# Patient Record
Sex: FEMALE | Race: WHITE | Employment: FULL TIME | URBAN - METROPOLITAN AREA
[De-identification: names, ages, dates, MRNs, and addresses within clinical notes are randomized per-mention and may not be internally consistent; named-entity substitution may affect disease eponyms.]

---
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
## 2023-02-01 ENCOUNTER — OFFICE VISIT (OUTPATIENT)
Dept: OBGYN CLINIC | Age: 18
End: 2023-02-01

## 2023-02-01 DIAGNOSIS — Z30.46 ENCOUNTER FOR NEXPLANON REMOVAL: Primary | ICD-10-CM

## 2023-02-01 DIAGNOSIS — Z30.09 BIRTH CONTROL COUNSELING: ICD-10-CM

## 2023-02-01 RX ORDER — DESOGESTREL AND ETHINYL ESTRADIOL 21-5 (28)
1 KIT ORAL DAILY
Qty: 3 PACKET | Refills: 3 | Status: SHIPPED | OUTPATIENT
Start: 2023-02-01

## 2023-02-01 ASSESSMENT — ENCOUNTER SYMPTOMS
ALLERGIC/IMMUNOLOGIC NEGATIVE: 1
RESPIRATORY NEGATIVE: 1
EYES NEGATIVE: 1
GASTROINTESTINAL NEGATIVE: 1

## 2023-02-01 NOTE — PROGRESS NOTES
Name: Aly Mckeon  Date: 2023  YOB: 2005  LMP: No LMP recorded. Ana Gonzalez is a 19 y.o.   who is here for a problem visit for nexplanon removal . She really tried hard but the BTB was just too much. Birth control: nexplanon placed in 1 year ago  Menstrual status: irregular cycles  Gyn Surgery: none    Health Maintenance:  Pap Smear: none  Any history of abnormal pap smear? no  Mammo: never done  HPV vaccine: not done  Colonoscopy: not ever done  Dexa scan: not needed    Review of Systems   Constitutional: Negative. HENT: Negative. Eyes: Negative. Respiratory: Negative. Cardiovascular: Negative. Gastrointestinal: Negative. Endocrine: Negative. Genitourinary: Negative. Musculoskeletal: Negative. Skin: Negative. Allergic/Immunologic: Negative. Neurological: Negative. Hematological: Negative. Psychiatric/Behavioral: Negative. Negative for self-injury and suicidal ideas. No past medical history on file. Past Surgical History:  2018: DILATION AND CURETTAGE OF UTERUS       Current Outpatient Medications:     sertraline (ZOLOFT) 50 MG tablet, TAKE 1 TABLET BY MOUTH ONCE A DAY FOR A WEEK, THEN INCREASE TO 2 TABLETS A DAY, Disp: 60 tablet, Rfl: 2    etonogestrel (NEXPLANON) 68 MG implant, Inject into the skin, Disp: , Rfl:     Family Cancer History:   Cancer-related family history includes Breast Cancer in her maternal grandfather and maternal grandmother; Cancer in her maternal grandmother; Ovarian Cancer in her maternal aunt. Social History:  reports that she has quit smoking. She uses smokeless tobacco. She reports that she does not drink alcohol and does not use drugs.     Ob History:  # 1 - Date: None, Sex: None, Weight: None, GA: None, Delivery: None, Apgar1: None, Apgar5: None, Living: None, Birth Comments: None    # 2 - Date: 2018, Sex: None, Weight: None, GA: None, Delivery: None, Apgar1: None, Apgar5: None, Living: None, Birth Comments: None         Sexual History:  reports that she is not currently sexually active and has had partner(s) who are male. She reports using the following method of birth control/protection: Implant. PHYSICAL EXAM:  Vitals:  vitals were not taken for this visit. There is no height or weight on file to calculate BMI. Physical Exam  Constitutional:       General: She is awake. She is not in acute distress. Appearance: Normal appearance. She is not ill-appearing. HENT:      Head: Normocephalic and atraumatic. Eyes:      Conjunctiva/sclera: Conjunctivae normal.   Cardiovascular:      Rate and Rhythm: Normal rate. Pulmonary:      Effort: Pulmonary effort is normal.   Musculoskeletal:         General: Normal range of motion. Cervical back: Normal range of motion. Skin:     General: Skin is warm and dry. Neurological:      General: No focal deficit present. Mental Status: She is alert and oriented to person, place, and time. Motor: Motor function is intact. Coordination: Coordination is intact. Psychiatric:         Behavior: Behavior normal.         Cognition and Memory: Cognition normal.         Judgment: Judgment normal.      .. PROCEDURE: nexplanon removal  Area cleansed with betadine  Injected with 1% lidocaine with epi  Incised with 11 blade  Nexplanon removed with curved carlos  Area cleansed and dressed  Pt tolerated procedure well.      Assessment: 19 y.o. , , here for problem visit for birth control changes  Nexplanon removed  Discussed birth control options, Will start ocp      Wali Sharma MD

## 2023-02-05 NOTE — PROGRESS NOTES
[facility] CARDIOLOGY History & Physical                 Reason for Visit: Sinus tachycardia    Subjective:     Patient is a 19 y.o. female who presents as a referral for sinus tachycardia. The patient visited the ED on January 27 for an upper respiratory infection. D-dimer was normal.  She was noted to have a leukocytosis. TSH was normal.  The patient was noted to be in sinus tachycardia. The patient had an ambulatory ECG monitor placed January 31 at [facility]. She reports that she recently returned to monitor via mail. The results are not readily apparent in the EMR. She reports fatigue and tachycardia. She also reports having 3 viral URIs in the last couple months. Her father accompanies her to her appointment and reports that he has HFrEF. No past medical history on file. Past Surgical History:   Procedure Laterality Date    DILATION AND CURETTAGE OF UTERUS  2018      Family History   Problem Relation Age of Onset    No Known Problems Mother     Diabetes Maternal Great Grandmother     Ovarian Cancer Maternal Aunt     No Known Problems Paternal Grandfather     No Known Problems Paternal Grandmother     Breast Cancer Maternal Grandfather     Breast Cancer Maternal Grandmother     Cancer Maternal Grandmother     No Known Problems Sister       Social History     Tobacco Use    Smoking status: Former    Smokeless tobacco: Current    Tobacco comments:     Quit smoking: Patient vapes   Substance Use Topics    Alcohol use: No      No Known Allergies      ROS:  No obvious pertinent positives on review of systems except for what was outlined above.        Objective:       BP (!) 110/102   Pulse 98   Wt 128 lb (58.1 kg)   BMI 25.85 kg/m²     BP Readings from Last 3 Encounters:   02/06/23 (!) 110/102   07/28/22 101/62   04/28/22 100/60       Wt Readings from Last 3 Encounters:   02/06/23 128 lb (58.1 kg)   12/15/22 132 lb (59.9 kg) (57 %, Z= 0.17)*   07/28/22 120 lb (54.4 kg) (35 %, Z= -0.39)*     * Growth percentiles are based on CDC (Girls, 2-20 Years) data. General/Constitutional:   Alert and oriented x 3, no acute distress  HEENT:   normocephalic, atraumatic, moist mucous membranes  Neck:   No JVD or carotid bruits bilaterally  Cardiovascular:   regular rate and rhythm, no rub/gallop appreciated  Pulmonary:   clear to auscultation bilaterally, no respiratory distress  Abdomen:   soft, non-tender, non-distended  Ext:   No sig LE edema bilaterally  Skin:  warm and dry, no obvious rashes seen  Neuro:   no obvious sensory or motor deficits  Psychiatric:   normal mood and affect    Data Review:   No results found for: CHOL  No results found for: TRIG  No results found for: HDL  No results found for: LDLCHOLESTEROL, LDLCALC  No results found for: LABVLDL, VLDL  No results found for: [facility]     Lab Results   Component Value Date/Time     07/28/2022 02:37 PM    K 3.6 07/28/2022 02:37 PM     07/28/2022 02:37 PM    CO2 22 07/28/2022 02:37 PM    BUN 8 07/28/2022 02:37 PM    CREATININE 0.70 07/28/2022 02:37 PM    GLUCOSE 82 07/28/2022 02:37 PM    CALCIUM 9.7 07/28/2022 02:37 PM         Lab Results   Component Value Date    ALT 54 07/28/2022    AST 34 07/28/2022        Assessment/Plan:   1.  Palpitations  - Follow-up ambulatory ECG monitor (obtain medical records)  - Obtain an echocardiogram    F/U: 4 weeks    Morgan Rees MD

## 2023-02-06 ENCOUNTER — OFFICE VISIT (OUTPATIENT)
Dept: CARDIOLOGY CLINIC | Age: 18
End: 2023-02-06
Payer: COMMERCIAL

## 2023-02-06 VITALS
BODY MASS INDEX: 25.85 KG/M2 | HEART RATE: 98 BPM | SYSTOLIC BLOOD PRESSURE: 110 MMHG | DIASTOLIC BLOOD PRESSURE: 102 MMHG | WEIGHT: 128 LBS

## 2023-02-06 DIAGNOSIS — R00.2 PALPITATIONS: Primary | ICD-10-CM

## 2023-02-06 PROBLEM — R00.0 SINUS TACHYCARDIA BY ELECTROCARDIOGRAM: Status: ACTIVE | Noted: 2023-02-06

## 2023-02-06 PROCEDURE — 99213 OFFICE O/P EST LOW 20 MIN: CPT | Performed by: INTERNAL MEDICINE

## 2023-02-08 ENCOUNTER — TELEPHONE (OUTPATIENT)
Dept: CARDIOLOGY CLINIC | Age: 18
End: 2023-02-08

## 2023-02-08 NOTE — TELEPHONE ENCOUNTER
Pt made aware of results. Verb understanding. Pt states her testing at [facility] cardiology indicated she had sinus tachycardia. Explained that is a rhythm issue, not an echocardiographic, heart function issue. States but t\"achycardia can cause heart failure and death. \" Explained that there is low risk for this with sinus tachycardia. Recommend pt keep f/u appt in March to discuss treatment plan. Verb understanding.

## 2023-02-08 NOTE — TELEPHONE ENCOUNTER
Pt made aware of Dr. Manish Coleman response. States she had her monitor placed at [address] [facility]. States she plans to continue to see both practices as she believes it's good to have a second opinion. Call placed to [facility] requesting monitor report. Spoke to Alf Thornton who will fax full report.

## 2023-02-08 NOTE — TELEPHONE ENCOUNTER
MD Manuelito Jackson RN  Caller: Unspecified (Today, 11:07 AM)  Please let the patient know that she was primarily in sinus rhythm. Her average heart rate was 97 bpm.  There is no evidence of atrial fibrillation, atrial flutter, or SVT. No ectopy was noted. She triggered the monitor with sinus tachycardia. No new changes in medical management at this time    Pt made aware of Dr. Brenda Pereira response. Requests to see another cardiologist in this practice. Please advise if OK to change to another provider.

## 2023-02-08 NOTE — TELEPHONE ENCOUNTER
----- Message from Ceasar Payne MD sent at 2/7/2023  6:09 PM EST -----  Please let the patient know that the heart function is normal on ECHO.

## 2023-02-08 NOTE — TELEPHONE ENCOUNTER
----- Message from Jessica Severino MD sent at 2/8/2023 10:15 AM EST -----  I don't have recommendations since I do not have the results of her ambulatory ECG monitor. Please acquire them if available.   ----- Message -----  From: Ethel Tatum RN  Sent: 2/8/2023   9:44 AM EST  To: Jessica Severino MD    Please advise any recommendations prior to f/u appt  ----- Message -----  From: Jessica Severino MD  Sent: 2/7/2023   6:09 PM EST  To: [facility]    Please let the patient know that the heart function is normal on ECHO. N/A

## 2023-03-02 ENCOUNTER — APPOINTMENT (OUTPATIENT)
Dept: CT IMAGING | Age: 18
End: 2023-03-02
Payer: COMMERCIAL

## 2023-03-02 ENCOUNTER — HOSPITAL ENCOUNTER (EMERGENCY)
Age: 18
Discharge: HOME | End: 2023-03-02
Attending: EMERGENCY MEDICINE
Payer: COMMERCIAL

## 2023-03-02 ENCOUNTER — APPOINTMENT (OUTPATIENT)
Dept: GENERAL RADIOLOGY | Age: 18
End: 2023-03-02
Payer: COMMERCIAL

## 2023-03-02 VITALS
SYSTOLIC BLOOD PRESSURE: 105 MMHG | RESPIRATION RATE: 17 BRPM | HEART RATE: 98 BPM | DIASTOLIC BLOOD PRESSURE: 72 MMHG | TEMPERATURE: 97.3 F | OXYGEN SATURATION: 96 %

## 2023-03-02 DIAGNOSIS — F41.0 PANIC ATTACK: Primary | ICD-10-CM

## 2023-03-02 LAB
EKG ATRIAL RATE: 102 BPM
EKG DIAGNOSIS: NORMAL
EKG P AXIS: 69 DEGREES
EKG P-R INTERVAL: 120 MS
EKG Q-T INTERVAL: 350 MS
EKG QRS DURATION: 80 MS
EKG QTC CALCULATION (BAZETT): 456 MS
EKG R AXIS: 80 DEGREES
EKG T AXIS: 57 DEGREES
EKG VENTRICULAR RATE: 102 BPM

## 2023-03-02 PROCEDURE — 71045 X-RAY EXAM CHEST 1 VIEW: CPT

## 2023-03-02 PROCEDURE — 70450 CT HEAD/BRAIN W/O DYE: CPT

## 2023-03-02 PROCEDURE — 93005 ELECTROCARDIOGRAM TRACING: CPT | Performed by: PHYSICIAN ASSISTANT

## 2023-03-02 PROCEDURE — 96372 THER/PROPH/DIAG INJ SC/IM: CPT

## 2023-03-02 PROCEDURE — 99284 EMERGENCY DEPT VISIT MOD MDM: CPT

## 2023-03-02 PROCEDURE — 6360000002 HC RX W HCPCS: Performed by: PHYSICIAN ASSISTANT

## 2023-03-02 RX ORDER — LORAZEPAM 2 MG/ML
0.5 INJECTION INTRAMUSCULAR ONCE
Status: DISCONTINUED | OUTPATIENT
Start: 2023-03-02 | End: 2023-03-02

## 2023-03-02 RX ORDER — HYDROXYZINE PAMOATE 25 MG/1
25 CAPSULE ORAL 3 TIMES DAILY PRN
Qty: 28 CAPSULE | Refills: 0 | Status: SHIPPED | OUTPATIENT
Start: 2023-03-02 | End: 2023-03-16

## 2023-03-02 RX ORDER — LORAZEPAM 2 MG/ML
0.5 INJECTION INTRAMUSCULAR ONCE
Status: COMPLETED | OUTPATIENT
Start: 2023-03-02 | End: 2023-03-02

## 2023-03-02 RX ADMIN — LORAZEPAM 0.5 MG: 2 INJECTION INTRAMUSCULAR; INTRAVENOUS at 11:44

## 2023-03-02 ASSESSMENT — ENCOUNTER SYMPTOMS
COUGH: 0
ABDOMINAL PAIN: 0
SORE THROAT: 0
NAUSEA: 0
SHORTNESS OF BREATH: 0
VOMITING: 0
BACK PAIN: 0

## 2023-03-02 NOTE — ED NOTES
I have reviewed discharge instructions with the patient. The patient verbalized understanding. Patient left ED via Discharge Method: ambulatory to Home with family. Opportunity for questions and clarification provided. Patient given 1 scripts. To continue your aftercare when you leave the hospital, you may receive an automated call from our care team to check in on how you are doing. This is a free service and part of our promise to provide the best care and service to meet your aftercare needs.  If you have questions, or wish to unsubscribe from this service please call [phone]. Thank you for Choosing our [facility] Emergency Department.         Lindy Yanez RN  03/02/23 4502

## 2023-03-02 NOTE — ED TRIAGE NOTES
Pt- lightheadedness since last night, body is getting numb, chest tight, \"hurting bad\". 8/10. Pain - whole body.

## 2023-03-02 NOTE — ED PROVIDER NOTES
Emergency Department Provider Note                   PCP:                On File Not (Inactive)               Age: 19 y.o. Sex: female       ICD-10-CM    1. Panic attack  F41.0           DISPOSITION Decision To Discharge 03/02/2023 12:00:14 PM       Maria Luz Pereira is a 19 y.o. female who presents to the Emergency Department with chief complaint of    Chief Complaint   Patient presents with    Numbness     body      Patient is a 64-year-old female history of underlying anxiety, presents here with chief complaint of \"chest hurting, headache, confusion and whole body going numb. She seemed very anxious in triage, stuttering most of her words which she states is new for her and she is never experienced before. She is very tearful in triage as well. She cannot localize her pinpoint any specific pains in her body she states her whole body feels weird. Denies any homicidal ideation or suicidal ideation. The history is provided by the patient. No  was used. Review of Systems   Constitutional:  Negative for chills and fever. HENT:  Negative for congestion and sore throat. Respiratory:  Negative for cough and shortness of breath. Cardiovascular:  Negative for chest pain and palpitations. Gastrointestinal:  Negative for abdominal pain, nausea and vomiting. Genitourinary:  Negative for dysuria and vaginal discharge. Musculoskeletal:  Negative for back pain. Skin:  Negative for wound. Neurological:  Positive for headaches. Psychiatric/Behavioral:  Positive for confusion. Negative for agitation. The patient is nervous/anxious. All other systems reviewed and are negative. No past medical history on file.      Past Surgical History:   Procedure Laterality Date    DILATION AND CURETTAGE OF UTERUS  2018        Family History   Problem Relation Age of Onset    No Known Problems Mother     Diabetes Maternal Great Grandmother     Ovarian Cancer Maternal Aunt     No Known Problems Paternal Grandfather     No Known Problems Paternal Grandmother     Breast Cancer Maternal Grandfather     Breast Cancer Maternal Grandmother     Cancer Maternal Grandmother     No Known Problems Sister         Social History     Socioeconomic History    Marital status: Single   Tobacco Use    Smoking status: Former    Smokeless tobacco: Current    Tobacco comments:     Quit smoking: Patient vapes   Vaping Use    Vaping Use: Every day   Substance and Sexual Activity    Alcohol use: No    Drug use: Never    Sexual activity: Not Currently     Partners: Male     Birth control/protection: Implant     Comment: nexplanon        Allergies: Patient has no known allergies. Discharge Medication List as of 3/2/2023 12:09 PM        CONTINUE these medications which have NOT CHANGED    Details   desogestrel-ethinyl estradiol (KARIVA) 0.15-0.02/0.01 MG (21/5) per tablet Take 1 tablet by mouth daily, Disp-3 packet, R-3Normal              Vitals signs and nursing note reviewed. Patient Vitals for the past 4 hrs:   Temp Pulse Resp BP SpO2   03/02/23 1145 -- 98 17 105/72 96 %   03/02/23 1045 -- 69 12 94/78 93 %   03/02/23 0937 97.3 °F (36.3 °C) 86 -- (!) 114/90 98 %          Physical Exam  Vitals and nursing note reviewed. Constitutional:       General: She is not in acute distress. Appearance: Normal appearance. She is not ill-appearing, toxic-appearing or diaphoretic. HENT:      Head: Normocephalic and atraumatic. Nose: Nose normal.      Mouth/Throat:      Mouth: Mucous membranes are moist.   Eyes:      Pupils: Pupils are equal, round, and reactive to light. Cardiovascular:      Rate and Rhythm: Normal rate. Pulmonary:      Effort: Pulmonary effort is normal. No respiratory distress. Abdominal:      General: Abdomen is flat. Palpations: Abdomen is soft. Tenderness: There is no abdominal tenderness. Musculoskeletal:         General: Normal range of motion.       Cervical back: Normal range of motion. No rigidity. Skin:     General: Skin is warm. Neurological:      General: No focal deficit present. Mental Status: She is alert. Psychiatric:         Mood and Affect: Mood normal.        Procedures    Medical Decision Making  Patient presented to this department with chief complaint of \"headache, feeling of something bad is about to happen, whole body numbness. She gets her chest is hurting very intensely. She was brought from triage to a private room where she sat with her boyfriend and grandmother, she continued to calm down with near complete resolution of her symptoms. On reexamination, she was smiling, playful and laughing. She reports her symptoms have since resolved. Had in-depth discussion with patient and patient family, did obtain some independent history from grandmother and patient to obtain their objective history of patient's symptoms over the last several days given that she is a poor historian. I did discuss with patient and through shared decision-making, we decided to obtain a head CT. This resulted with no acute findings. I do suspect anxiety underlying with this patient and the cause of her symptoms and ER visit today. She continued improved after Ativan here in department. I will send her home with a short course of Vistaril and encouraged her to follow-up with her therapist which she is already established. EKG: EKG interpretation in the absence of a cardiologist.  Patient with a rate of 102, no ectopic beats. MO interval of 120. No ischemic changes, no T wave inversion or ST depression. Problems Addressed:  Panic attack: acute illness or injury with systemic symptoms    Amount and/or Complexity of Data Reviewed  Radiology: ordered and independent interpretation performed. Details: agree with rads  ECG/medicine tests: ordered. Risk  Prescription drug management. Complexity of Problem: 1 acute illness with systemic symptoms.  (4)  The patients assessment required an independent historian: I spoke with a parent. The patients assessment required an independent historian: I spoke with a family member. I independently ordered and reviewed the CT Scan. Nml agree with rads  I reviewed records from an external source: ED records from outside this hospital.  I reviewed records from an external source: provider visit notes from PCP. Considerations: Shared decision making was utilized in the care of this patient. We discussed care recommended by provider that patient declined (tests, disposition, etc). We discussed care requested by patient that the provider declined (includes tests, admit, dc, antibiotics, etc). Patient was discharged risks and benefits of hospitalization were considered. Orders Placed This Encounter   Procedures    XR CHEST 1 VIEW    CT HEAD WO CONTRAST    EKG 12 Lead        Medications   LORazepam (ATIVAN) injection 0.5 mg (0.5 mg IntraMUSCular Given 3/2/23 1144)       Discharge Medication List as of 3/2/2023 12:09 PM        START taking these medications    Details   hydrOXYzine pamoate (VISTARIL) 25 MG capsule Take 1 capsule by mouth 3 times daily as needed for Itching, Disp-28 capsule, R-0Print                     Voice dictation software was used during the making of this note. This software is not perfect and grammatical and other typographical errors may be present. This note has not been completely proofread for errors.      Benjy Watson [place]  03/02/23 [id number]

## 2023-03-02 NOTE — DISCHARGE INSTRUCTIONS
Your work-up today was unremarkable, did not reveal any emergent or life-threatening cause. I do suspect that you had a panic attack this morning and that was what caused you to come to the emergency room department. He seemed much improved after resting and quality time with your family in the hospital room. I will send you home with a short prescription of medication called Vistaril. This can be used as needed for anxiety like symptoms.

## 2023-03-13 ENCOUNTER — TELEPHONE (OUTPATIENT)
Dept: CARDIOLOGY CLINIC | Age: 18
End: 2023-03-13

## 2023-03-13 NOTE — TELEPHONE ENCOUNTER
----- Message from Fabio Rooney MD sent at 3/12/2023  1:11 PM EDT -----  Please cancel this patient's appointment. She has elected to follow-up with Dr. Becca Olsen and has close follow-up. Because I am overbooked on this day, please block her time slot, so no one can be scheduled at that time.     NOTE SENT TO SCHEDULING TO MAKE IT SO

## 2024-03-28 ENCOUNTER — OFFICE VISIT (OUTPATIENT)
Dept: OBGYN CLINIC | Age: 19
End: 2024-03-28

## 2024-03-28 VITALS — WEIGHT: 131 LBS | BODY MASS INDEX: 26.46 KG/M2 | SYSTOLIC BLOOD PRESSURE: 120 MMHG | DIASTOLIC BLOOD PRESSURE: 78 MMHG

## 2024-03-28 DIAGNOSIS — N92.6 MISSED MENSES: ICD-10-CM

## 2024-03-28 DIAGNOSIS — N92.6 MISSED MENSES: Primary | ICD-10-CM

## 2024-03-28 LAB — HCG SERPL-ACNC: <1 MIU/ML (ref 0–6)

## 2024-03-28 PROCEDURE — 99212 OFFICE O/P EST SF 10 MIN: CPT | Performed by: NURSE PRACTITIONER

## 2024-03-28 RX ORDER — PROPRANOLOL HYDROCHLORIDE 10 MG/1
TABLET ORAL 3 TIMES DAILY PRN
COMMUNITY
Start: 2023-02-23 | End: 2024-03-28

## 2024-03-28 RX ORDER — ATENOLOL 25 MG/1
12.5 TABLET ORAL DAILY
COMMUNITY
Start: 2024-02-02

## 2024-03-28 NOTE — PROGRESS NOTES
CC:   Chief Complaint   Patient presents with    Amenorrhea       HPI:    Amy  is a 19 y.o. , , Patient's last menstrual period was 2024., who is seen today for missed menses.  took pregnancy home at 6 days ago and was negative and is requesting blood work be drawn.  has been experiencing nausea over the past few days. States \"I normally eat [place] no[place] 4 times a week and when I went to eat it, it didn't taste good to me which I thought was weird.\" She denies abnormal vaginal discharge, itching, odor, urinary frequency, urgency or dysuria today.     Denies increase in stress, recent infection and increase in exercise.    Contraception: None    Menses:  Q 29-30 Days x 6-7 days, Moderate  Flow, No intermenstrual VB/spotting, Severe dysmenorrhea (does not take any OTC medications.     Sexually active-male partner (same partner for past 5 years)  Denies post coital VB or dyspareunia.      GYN HISTORY:  As per HPI     Hx STDs: Hx chlamydia (when patient was 13 yo)    Last Pap: Never due to age, just turned 19 in .       Current Outpatient Medications on File Prior to Visit   Medication Sig Dispense Refill    atenolol (TENORMIN) 25 MG tablet Take 0.5 tablets by mouth daily       No current facility-administered medications on file prior to visit.         History reviewed. No pertinent past medical history.      Past Surgical History:   Procedure Laterality Date    DILATION AND CURETTAGE OF UTERUS  2018         Outpatient Encounter Medications as of 3/28/2024   Medication Sig Dispense Refill    atenolol (TENORMIN) 25 MG tablet Take 0.5 tablets by mouth daily      [DISCONTINUED] propranolol (INDERAL) 10 MG tablet Take by mouth 3 times daily as needed (Patient not taking: Reported on 3/28/2024)      [DISCONTINUED] desogestrel-ethinyl estradiol (KARIVA) 0.15-0.02/0.01 MG () per tablet Take 1 tablet by mouth daily (Patient not taking: Reported on 3/28/2024) 3 packet 3     No

## 2024-06-25 ENCOUNTER — OFFICE VISIT (OUTPATIENT)
Dept: OBGYN CLINIC | Age: 19
End: 2024-06-25

## 2024-06-25 VITALS
HEIGHT: 59 IN | DIASTOLIC BLOOD PRESSURE: 66 MMHG | WEIGHT: 123 LBS | SYSTOLIC BLOOD PRESSURE: 102 MMHG | BODY MASS INDEX: 24.8 KG/M2

## 2024-06-25 DIAGNOSIS — Z30.013 ENCOUNTER FOR INITIAL PRESCRIPTION OF INJECTABLE CONTRACEPTIVE: ICD-10-CM

## 2024-06-25 DIAGNOSIS — Z30.09 ENCOUNTER FOR COUNSELING REGARDING CONTRACEPTION: Primary | ICD-10-CM

## 2024-06-25 PROCEDURE — 99213 OFFICE O/P EST LOW 20 MIN: CPT | Performed by: NURSE PRACTITIONER

## 2024-06-25 RX ORDER — MEDROXYPROGESTERONE ACETATE 150 MG/ML
150 INJECTION, SUSPENSION INTRAMUSCULAR ONCE
Qty: 1 ML | Refills: 0
Start: 2024-06-25 | End: 2024-06-26 | Stop reason: SDUPTHER

## 2024-06-25 NOTE — PROGRESS NOTES
CC:   Chief Complaint   Patient presents with    Contraception     States she received ok from cardiologist to take birth control       HPI:    Amy  is a 19 y.o. , , Patient's last menstrual period was 2024.,  who is seen today to discuss contraception. The patient states she was being seen by cardiology in February due to tachycardia. In reviewing notes from cardiology, Sinus tachycardia, episodic, symptomatic: Amy will begin a trial of atenolol 12.5 mg daily for this. I asked her to correspond with me if she felt she was having difficulty with this medication rather than simply stopping it. This is a very low dose of atenolol, and we can escalate the dose as her symptoms and heart rate require and as her blood pressure allows. She will continue to try to pursue management of her anxiety with Psychiatry and will follow up in 6 months.     The patient states she would like to discuss options for contraception. She informs me that she has used all options in the past except for an IUD. States she did like the Nexplanon and Depo in the past and would like to discuss these options today. She denies abnormal vaginal discharge, itching, odor, urinary frequency, urgency or dysuria today.     Contraception: None currently      Menses: Q 30 Days x 5-6 days, Moderate Flow, No intermenstrual VB/spotting, Severe dysmenorrhea (takes OTC tylenol during cycle).     Sexually active with male partner. No concerns for STDs-declines STD testing today.   Denies post coital VB or dyspareunia.        GYN HISTORY:  As per HPI     Hx STDs: Hx chlamydia (when patient was 13 yo)     Last Pap: N/a due to age, just turned 19 in .       Current Outpatient Medications on File Prior to Visit   Medication Sig Dispense Refill    atenolol (TENORMIN) 25 MG tablet Take 0.5 tablets by mouth daily (Patient not taking: Reported on 2024)       No current facility-administered medications on file prior to visit.         Past

## 2024-06-26 ENCOUNTER — CLINICAL DOCUMENTATION (OUTPATIENT)
Dept: OBGYN CLINIC | Age: 19
End: 2024-06-26

## 2024-06-26 DIAGNOSIS — Z30.09 ENCOUNTER FOR COUNSELING REGARDING CONTRACEPTION: ICD-10-CM

## 2024-06-26 RX ORDER — MEDROXYPROGESTERONE ACETATE 150 MG/ML
150 INJECTION, SUSPENSION INTRAMUSCULAR
Qty: 1 ML | Refills: 0 | Status: SHIPPED | OUTPATIENT
Start: 2024-06-26

## 2024-06-26 NOTE — PROGRESS NOTES
Pt presents today for depo injection. Medication supplied by patient.   Last Depo-Provera: n/a.  Side Effects if any: n/a.  Serum HCG indicated? Yes- NEG.    Depo-Provera 150 mg IM given by: JACKI Barraza RN.  Injection Site - L Deltoid   - [employer]  Lot # - [id number]  Expiration - 07/31/26  NDC - 24308-493-41    Pt tolerated well     Next appointment due between 09/11/24-09/25/24.

## 2024-06-27 ENCOUNTER — APPOINTMENT (OUTPATIENT)
Dept: GENERAL RADIOLOGY | Age: 19
End: 2024-06-27

## 2024-06-27 ENCOUNTER — TELEPHONE (OUTPATIENT)
Dept: OBGYN CLINIC | Age: 19
End: 2024-06-27

## 2024-06-27 ENCOUNTER — HOSPITAL ENCOUNTER (EMERGENCY)
Age: 19
Discharge: HOME | End: 2024-06-27

## 2024-06-27 VITALS
DIASTOLIC BLOOD PRESSURE: 77 MMHG | SYSTOLIC BLOOD PRESSURE: 111 MMHG | BODY MASS INDEX: 24.8 KG/M2 | RESPIRATION RATE: 16 BRPM | TEMPERATURE: 98.4 F | OXYGEN SATURATION: 98 % | WEIGHT: 123 LBS | HEART RATE: 97 BPM | HEIGHT: 59 IN

## 2024-06-27 DIAGNOSIS — M79.602 LEFT ARM PAIN: ICD-10-CM

## 2024-06-27 DIAGNOSIS — R06.02 SHORTNESS OF BREATH: Primary | ICD-10-CM

## 2024-06-27 LAB
ALBUMIN SERPL-MCNC: 4.3 G/DL (ref 3.5–5)
ALBUMIN/GLOB SERPL: 1.2 (ref 1–1.9)
ALP SERPL-CCNC: 70 U/L (ref 35–104)
ALT SERPL-CCNC: 53 U/L (ref 12–65)
ANION GAP SERPL CALC-SCNC: 14 MMOL/L (ref 9–18)
AST SERPL-CCNC: 43 U/L (ref 15–37)
BACTERIA URNS QL MICRO: ABNORMAL /HPF
BASOPHILS # BLD: 0.1 K/UL (ref 0–0.2)
BASOPHILS NFR BLD: 1 % (ref 0–2)
BILIRUB SERPL-MCNC: 0.2 MG/DL (ref 0–1.2)
BILIRUB UR QL: NEGATIVE
BUN SERPL-MCNC: 11 MG/DL (ref 6–23)
CALCIUM SERPL-MCNC: 9.4 MG/DL (ref 8.8–10.2)
CHLORIDE SERPL-SCNC: 102 MMOL/L (ref 98–107)
CO2 SERPL-SCNC: 21 MMOL/L (ref 20–28)
CREAT SERPL-MCNC: 0.7 MG/DL (ref 0.6–1.1)
D DIMER PPP FEU-MCNC: <0.27 UG/ML(FEU)
DIFFERENTIAL METHOD BLD: NORMAL
EOSINOPHIL # BLD: 0.2 K/UL (ref 0–0.8)
EOSINOPHIL NFR BLD: 2 % (ref 0.5–7.8)
EPI CELLS #/AREA URNS HPF: ABNORMAL /HPF
ERYTHROCYTE [DISTWIDTH] IN BLOOD BY AUTOMATED COUNT: 11.9 % (ref 11.9–14.6)
GLOBULIN SER CALC-MCNC: 3.6 G/DL (ref 2.3–3.5)
GLUCOSE SERPL-MCNC: 113 MG/DL (ref 70–99)
GLUCOSE UR QL STRIP.AUTO: NEGATIVE MG/DL
HCG UR QL: NEGATIVE
HCT VFR BLD AUTO: 42.7 % (ref 35.8–46.3)
HGB BLD-MCNC: 14.3 G/DL (ref 11.7–15.4)
HYALINE CASTS URNS QL MICRO: ABNORMAL /LPF
IMM GRANULOCYTES # BLD AUTO: 0 K/UL (ref 0–0.5)
IMM GRANULOCYTES NFR BLD AUTO: 0 % (ref 0–5)
KETONES UR-MCNC: NEGATIVE MG/DL
LEUKOCYTE ESTERASE UR QL STRIP: ABNORMAL
LYMPHOCYTES # BLD: 3.3 K/UL (ref 0.5–4.6)
LYMPHOCYTES NFR BLD: 33 % (ref 13–44)
MAGNESIUM SERPL-MCNC: 2.1 MG/DL (ref 1.8–2.4)
MCH RBC QN AUTO: 29.6 PG (ref 26.1–32.9)
MCHC RBC AUTO-ENTMCNC: 33.5 G/DL (ref 31.4–35)
MCV RBC AUTO: 88.4 FL (ref 82–102)
MONOCYTES # BLD: 0.7 K/UL (ref 0.1–1.3)
MONOCYTES NFR BLD: 7 % (ref 4–12)
NEUTS SEG # BLD: 5.6 K/UL (ref 1.7–8.2)
NEUTS SEG NFR BLD: 56 % (ref 43–78)
NITRITE UR QL: NEGATIVE
NRBC # BLD: 0 K/UL (ref 0–0.2)
PH UR: 7 (ref 5–9)
PLATELET # BLD AUTO: 322 K/UL (ref 150–450)
PMV BLD AUTO: 9.7 FL (ref 9.4–12.3)
POTASSIUM SERPL-SCNC: 4.3 MMOL/L (ref 3.5–5.1)
PROT SERPL-MCNC: 7.9 G/DL (ref 6.3–8.2)
PROT UR QL: NEGATIVE MG/DL
RBC # BLD AUTO: 4.83 M/UL (ref 4.05–5.2)
RBC # UR STRIP: ABNORMAL
RBC #/AREA URNS HPF: ABNORMAL /HPF
SERVICE CMNT-IMP: ABNORMAL
SODIUM SERPL-SCNC: 137 MMOL/L (ref 136–145)
SP GR UR: 1.01 (ref 1–1.02)
TSH W FREE THYROID IF ABNORMAL: 2.93 UIU/ML (ref 0.27–4.2)
UROBILINOGEN UR QL: 0.2 EU/DL (ref 0.2–1)
WBC # BLD AUTO: 10 K/UL (ref 4.3–11.1)
WBC URNS QL MICRO: ABNORMAL /HPF

## 2024-06-27 PROCEDURE — 81001 URINALYSIS AUTO W/SCOPE: CPT

## 2024-06-27 PROCEDURE — 80053 COMPREHEN METABOLIC PANEL: CPT

## 2024-06-27 PROCEDURE — 84443 ASSAY THYROID STIM HORMONE: CPT

## 2024-06-27 PROCEDURE — 99285 EMERGENCY DEPT VISIT HI MDM: CPT

## 2024-06-27 PROCEDURE — 71046 X-RAY EXAM CHEST 2 VIEWS: CPT

## 2024-06-27 PROCEDURE — 83735 ASSAY OF MAGNESIUM: CPT

## 2024-06-27 PROCEDURE — 96374 THER/PROPH/DIAG INJ IV PUSH: CPT

## 2024-06-27 PROCEDURE — 6370000000 HC RX 637 (ALT 250 FOR IP): Performed by: NURSE PRACTITIONER

## 2024-06-27 PROCEDURE — 93005 ELECTROCARDIOGRAM TRACING: CPT | Performed by: EMERGENCY MEDICINE

## 2024-06-27 PROCEDURE — 81025 URINE PREGNANCY TEST: CPT

## 2024-06-27 PROCEDURE — 6360000002 HC RX W HCPCS: Performed by: NURSE PRACTITIONER

## 2024-06-27 PROCEDURE — 85379 FIBRIN DEGRADATION QUANT: CPT

## 2024-06-27 PROCEDURE — 87086 URINE CULTURE/COLONY COUNT: CPT

## 2024-06-27 PROCEDURE — 85025 COMPLETE CBC W/AUTO DIFF WBC: CPT

## 2024-06-27 RX ORDER — KETOROLAC TROMETHAMINE 15 MG/ML
15 INJECTION, SOLUTION INTRAMUSCULAR; INTRAVENOUS ONCE
Status: COMPLETED | OUTPATIENT
Start: 2024-06-27 | End: 2024-06-27

## 2024-06-27 RX ORDER — LIDOCAINE 4 G/G
1 PATCH TOPICAL
Status: DISCONTINUED | OUTPATIENT
Start: 2024-06-27 | End: 2024-06-27 | Stop reason: HOSPADM

## 2024-06-27 RX ORDER — KETOROLAC TROMETHAMINE 10 MG/1
10 TABLET, FILM COATED ORAL EVERY 6 HOURS PRN
Qty: 10 TABLET | Refills: 0 | Status: SHIPPED | OUTPATIENT
Start: 2024-06-27

## 2024-06-27 RX ADMIN — KETOROLAC TROMETHAMINE 15 MG: 15 INJECTION, SOLUTION INTRAMUSCULAR; INTRAVENOUS at 18:38

## 2024-06-27 ASSESSMENT — PAIN DESCRIPTION - ORIENTATION: ORIENTATION: LEFT

## 2024-06-27 ASSESSMENT — ENCOUNTER SYMPTOMS: SHORTNESS OF BREATH: 1

## 2024-06-27 ASSESSMENT — PAIN SCALES - GENERAL
PAINLEVEL_OUTOF10: 6
PAINLEVEL_OUTOF10: 3

## 2024-06-27 ASSESSMENT — PAIN DESCRIPTION - LOCATION: LOCATION: ARM

## 2024-06-27 ASSESSMENT — PAIN - FUNCTIONAL ASSESSMENT: PAIN_FUNCTIONAL_ASSESSMENT: 0-10

## 2024-06-27 NOTE — ED TRIAGE NOTES
Patient reports depo injection in left arm yesterday. Patient had left arm pain and swelling, reports pain traveled to her back. Patient reports palpitations x 1 hour.

## 2024-06-27 NOTE — ED PROVIDER NOTES
Appearance: Normal appearance. She is well-developed. She is not ill-appearing, toxic-appearing or diaphoretic.   HENT:      Head: Normocephalic and atraumatic.   Eyes:      General: No scleral icterus.  Cardiovascular:      Rate and Rhythm: Regular rhythm. Tachycardia present.      Pulses:           Radial pulses are 2+ on the right side and 2+ on the left side.      Heart sounds: Normal heart sounds.   Pulmonary:      Effort: Pulmonary effort is normal. No respiratory distress.      Breath sounds: Normal breath sounds.   Musculoskeletal:         General: No swelling or tenderness. Normal range of motion.      Comments: Patient with full range of motion of the left shoulder and upper extremity.  Mild tenderness to the left deltoid.  Sensation is intact to left upper extremity.  No swelling noted on exam.  No erythema or signs of infectious process.  Neurovascularly intact left upper extremity.   Neurological:      General: No focal deficit present.      Mental Status: She is alert and oriented to person, place, and time.        Procedures     Procedures    Orders Placed This Encounter   Procedures    Culture, Urine    XR CHEST (2 VW)    CBC with Auto Differential    CMP    Magnesium    TSH with Reflex    D-Dimer, Quantitative    Urinalysis, Micro    POCT Urine Dipstick    POC PREGNANCY UR-QUAL    POCT Urinalysis no Micro    POC Pregnancy Urine Qual    EKG 12 Lead    Saline lock IV        Medications given during this emergency department visit:  Medications   lidocaine 4 % external patch 1 patch (1 patch TransDERmal Patch Applied 6/27/24 1839)   ketorolac (TORADOL) injection 15 mg (15 mg IntraVENous Given 6/27/24 1838)       Discharge Medication List as of 6/27/2024  7:40 PM        START taking these medications    Details   ketorolac (TORADOL) 10 MG tablet Take 1 tablet by mouth every 6 hours as needed for Pain, Disp-10 tablet, R-0Normal              Past Medical History:   Diagnosis Date    Palpitations

## 2024-06-27 NOTE — DISCHARGE INSTRUCTIONS
As we discussed, your workup in the emergency department today is reassuring.  I am concerned that the injection of the Depo-Provera irritated a nerve and possibly tendon in your arm, causing you to have this pain and numbness.  Take medication as prescribed to help with pain and inflammation.  Do not take any other NSAIDs (aspirin, ibuprofen, Motrin, Advil, or Aleve) while taking this medication.  Apply over-the-counter lidocaine patches to help with discomfort.  Application of heat for 15 or 20 minutes with a heating pad every few hours can also help with discomfort.  There is no indication that you had a blood clot based on your workup today.  Your chest x-ray is clear.  Your labs are very reassuring and normal.  As we discussed, I have sent your urine for culture.  If this grows back any bacteria, we will call you and send in an appropriate antibiotic.  Please call your OB/GYN to discuss the symptoms you have experienced and make sure that they want to keep you on the Depo-Provera shot.  Return to the emergency department for any new, worsening, or concerning symptoms.

## 2024-06-27 NOTE — TELEPHONE ENCOUNTER
Pt LVM stating she is having potential rxn to depo injection yesterday. Reports SOB, painful/swollen arm, and left sided back pain.     Spoke with pt. Advised she be evaluated at ER now. Pt agrees with plan.

## 2024-06-28 LAB
EKG ATRIAL RATE: 124 BPM
EKG DIAGNOSIS: NORMAL
EKG P AXIS: 57 DEGREES
EKG P-R INTERVAL: 114 MS
EKG Q-T INTERVAL: 310 MS
EKG QRS DURATION: 72 MS
EKG QTC CALCULATION (BAZETT): 445 MS
EKG R AXIS: 61 DEGREES
EKG T AXIS: 47 DEGREES
EKG VENTRICULAR RATE: 124 BPM

## 2024-06-28 PROCEDURE — 93010 ELECTROCARDIOGRAM REPORT: CPT | Performed by: INTERNAL MEDICINE

## 2024-06-30 LAB
BACTERIA SPEC CULT: NORMAL
BACTERIA SPEC CULT: NORMAL
SERVICE CMNT-IMP: NORMAL

## 2024-09-03 ENCOUNTER — TELEPHONE (OUTPATIENT)
Dept: OBGYN CLINIC | Age: 19
End: 2024-09-03

## 2024-09-03 NOTE — TELEPHONE ENCOUNTER
Returned call to patient. Explained tomorrow's appointment is for annual exam. Next Depo scheduled 9/18/24.

## 2024-09-03 NOTE — TELEPHONE ENCOUNTER
Patient called requesting call to pharmacy for refill on depo provera. States pharmacy will not fill until 9/5. Last injection 06/26/24. Next Dope injection scheduled for 9/18/24.

## 2024-09-04 ENCOUNTER — OFFICE VISIT (OUTPATIENT)
Dept: OBGYN CLINIC | Age: 19
End: 2024-09-04

## 2024-09-04 VITALS
BODY MASS INDEX: 23.79 KG/M2 | DIASTOLIC BLOOD PRESSURE: 64 MMHG | HEIGHT: 59 IN | WEIGHT: 118 LBS | SYSTOLIC BLOOD PRESSURE: 102 MMHG

## 2024-09-04 DIAGNOSIS — Z01.419 WELL WOMAN EXAM WITH ROUTINE GYNECOLOGICAL EXAM: Primary | ICD-10-CM

## 2024-09-04 DIAGNOSIS — Z12.4 SCREENING FOR CERVICAL CANCER: ICD-10-CM

## 2024-09-04 DIAGNOSIS — O03.9 SPONTANEOUS ABORTION: ICD-10-CM

## 2024-09-04 DIAGNOSIS — Z11.3 SCREEN FOR STD (SEXUALLY TRANSMITTED DISEASE): ICD-10-CM

## 2024-09-04 LAB
HBV SURFACE AG SER QL: NONREACTIVE
HCG SERPL-ACNC: <1 MIU/ML
HCV AB SER QL: NONREACTIVE
HIV 1+2 AB+HIV1 P24 AG SERPL QL IA: NONREACTIVE
HIV 1/2 RESULT COMMENT: NORMAL
T PALLIDUM AB SER QL IA: NONREACTIVE

## 2024-09-04 PROCEDURE — 99395 PREV VISIT EST AGE 18-39: CPT | Performed by: OBSTETRICS & GYNECOLOGY

## 2024-09-04 PROCEDURE — 99459 PELVIC EXAMINATION: CPT | Performed by: OBSTETRICS & GYNECOLOGY

## 2024-09-04 SDOH — ECONOMIC STABILITY: FOOD INSECURITY: WITHIN THE PAST 12 MONTHS, YOU WORRIED THAT YOUR FOOD WOULD RUN OUT BEFORE YOU GOT MONEY TO BUY MORE.: NEVER TRUE

## 2024-09-04 SDOH — ECONOMIC STABILITY: FOOD INSECURITY: WITHIN THE PAST 12 MONTHS, THE FOOD YOU BOUGHT JUST DIDN'T LAST AND YOU DIDN'T HAVE MONEY TO GET MORE.: NEVER TRUE

## 2024-09-04 SDOH — ECONOMIC STABILITY: INCOME INSECURITY: HOW HARD IS IT FOR YOU TO PAY FOR THE VERY BASICS LIKE FOOD, HOUSING, MEDICAL CARE, AND HEATING?: NOT HARD AT ALL

## 2024-09-04 ASSESSMENT — ENCOUNTER SYMPTOMS
GASTROINTESTINAL NEGATIVE: 1
ALLERGIC/IMMUNOLOGIC NEGATIVE: 1
EYES NEGATIVE: 1
RESPIRATORY NEGATIVE: 1

## 2024-09-04 NOTE — PROGRESS NOTES
woman: pap is done  2.   pregnancy prevention: depo  3. ? SAB: quant today  4. STD screen per pt requenst    Mary Peacock MD

## 2024-09-09 LAB
C TRACH RRNA CVX QL NAA+PROBE: POSITIVE
COLLECTION METHOD: ABNORMAL
CYTOLOGIST CVX/VAG CYTO: ABNORMAL
CYTOLOGY CVX/VAG DOC THIN PREP: ABNORMAL
HPV REFLEX: ABNORMAL
Lab: ABNORMAL
N GONORRHOEA RRNA CVX QL NAA+PROBE: NEGATIVE
OTHER PT INFO: ABNORMAL
PAP SOURCE: ABNORMAL
PATH REPORT.FINAL DX SPEC: ABNORMAL
PREV CYTO INFO: ABNORMAL
PREV TREATMENT RESULTS: NEGATIVE
PREV TREATMENT: ABNORMAL
STAT OF ADQ CVX/VAG CYTO-IMP: ABNORMAL
T VAGINALIS RRNA SPEC QL NAA+PROBE: NEGATIVE

## 2024-09-13 DIAGNOSIS — A74.9 CHLAMYDIA: Primary | ICD-10-CM

## 2024-09-13 RX ORDER — AZITHROMYCIN 500 MG/1
1000 TABLET, FILM COATED ORAL ONCE
Qty: 2 TABLET | Refills: 0 | Status: SHIPPED | OUTPATIENT
Start: 2024-09-13 | End: 2024-09-13

## 2024-09-16 DIAGNOSIS — Z30.09 ENCOUNTER FOR COUNSELING REGARDING CONTRACEPTION: ICD-10-CM

## 2024-09-16 RX ORDER — MEDROXYPROGESTERONE ACETATE 150 MG/ML
150 INJECTION, SUSPENSION INTRAMUSCULAR
Qty: 1 ML | Refills: 0 | OUTPATIENT
Start: 2024-09-16

## 2024-09-16 RX ORDER — MEDROXYPROGESTERONE ACETATE 150 MG/ML
150 INJECTION, SUSPENSION INTRAMUSCULAR
Qty: 1 ML | Refills: 2 | Status: SHIPPED | OUTPATIENT
Start: 2024-09-16

## 2024-09-18 ENCOUNTER — CLINICAL DOCUMENTATION (OUTPATIENT)
Dept: OBGYN CLINIC | Age: 19
End: 2024-09-18

## 2024-10-16 ENCOUNTER — OFFICE VISIT (OUTPATIENT)
Dept: OBGYN CLINIC | Age: 19
End: 2024-10-16

## 2024-10-16 VITALS
WEIGHT: 125 LBS | HEIGHT: 59 IN | SYSTOLIC BLOOD PRESSURE: 110 MMHG | BODY MASS INDEX: 25.2 KG/M2 | DIASTOLIC BLOOD PRESSURE: 70 MMHG

## 2024-10-16 DIAGNOSIS — A74.9 CHLAMYDIA INFECTION: Primary | ICD-10-CM

## 2024-10-16 DIAGNOSIS — N89.8 VAGINAL DISCHARGE: ICD-10-CM

## 2024-10-16 PROCEDURE — 99213 OFFICE O/P EST LOW 20 MIN: CPT | Performed by: OBSTETRICS & GYNECOLOGY

## 2024-10-16 PROCEDURE — 99459 PELVIC EXAMINATION: CPT | Performed by: OBSTETRICS & GYNECOLOGY

## 2024-10-16 ASSESSMENT — ENCOUNTER SYMPTOMS
EYES NEGATIVE: 1
GASTROINTESTINAL NEGATIVE: 1
ALLERGIC/IMMUNOLOGIC NEGATIVE: 1
RESPIRATORY NEGATIVE: 1

## 2024-10-16 NOTE — PROGRESS NOTES
Name: Amy Bertrand  Date: 10/16/2024  YOB: 2005  LMP: No LMP recorded.      Amy is a 19 y.o.   who is here for a follow up for positive chlamydia on 24 . Has not been with anyone since taking meds.     Amy  reports being sexually active and has had partner(s) who are male. She reports using the following method of birth control/protection: Implant.  Contraception: Depo-Provera injections.   Menstrual status: none  Gyn Surgery: none     Women's Health Timeline:  : nexplanon removal  : nexplanon placed       Pap History:  Diagnosis:   Date Value Ref Range Status   2024 Comment   Final     Comment:     (NOTE)  NEGATIVE FOR INTRAEPITHELIAL LESION OR MALIGNANCY.          OB History:  OB History          1    Para   0    Term                AB   1    Living             SAB        IAB        Ectopic        Molar        Multiple        Live Births                     Medical History:  Past Medical History:  2024: Chlamydia  No date: Palpitations     Surgical History:  Past Surgical History:  2018: DILATION AND CURETTAGE OF UTERUS     Meds:    Current Outpatient Medications:     medroxyPROGESTERone (DEPO-PROVERA) 150 MG/ML injection, Inject 1 mL into the muscle every 3 months, Disp: 1 mL, Rfl: 2    atenolol (TENORMIN) 25 MG tablet, Take 0.5 tablets by mouth daily, Disp: , Rfl:     ketorolac (TORADOL) 10 MG tablet, Take 1 tablet by mouth every 6 hours as needed for Pain (Patient not taking: Reported on 10/16/2024), Disp: 10 tablet, Rfl: 0    Family Cancer History:   Cancer-related family history includes Breast Cancer in her maternal grandfather and maternal grandmother; Cancer in her maternal grandmother; Ovarian Cancer in her maternal aunt.     Social History:    reports that she has never smoked. She has never used smokeless tobacco. She reports current alcohol use. She reports that she does not use drugs.    Review Of Systems:  Review of Systems

## 2024-10-20 LAB
A VAGINAE DNA VAG QL NAA+PROBE: ABNORMAL SCORE
BVAB2 DNA VAG QL NAA+PROBE: ABNORMAL SCORE
C ALBICANS DNA VAG QL NAA+PROBE: NEGATIVE
C GLABRATA DNA VAG QL NAA+PROBE: NEGATIVE
C TRACH RRNA SPEC QL NAA+PROBE: NEGATIVE
MEGA1 DNA VAG QL NAA+PROBE: ABNORMAL SCORE
N GONORRHOEA RRNA SPEC QL NAA+PROBE: NEGATIVE
T VAGINALIS RRNA SPEC QL NAA+PROBE: NEGATIVE

## 2024-10-21 LAB
A VAGINAE DNA VAG QL NAA+PROBE: ABNORMAL SCORE
BVAB2 DNA VAG QL NAA+PROBE: ABNORMAL SCORE
C ALBICANS DNA VAG QL NAA+PROBE: NEGATIVE
C GLABRATA DNA VAG QL NAA+PROBE: NEGATIVE
C TRACH RRNA SPEC QL NAA+PROBE: NEGATIVE
CANDIDA KRUSEI: NEGATIVE
CANDIDA LUSITANIAE, NAA: NEGATIVE
CANDIDA PARAPSILOSIS/TROPICALIS: NEGATIVE
MEGA1 DNA VAG QL NAA+PROBE: ABNORMAL SCORE
N GONORRHOEA RRNA SPEC QL NAA+PROBE: NEGATIVE
T VAGINALIS RRNA SPEC QL NAA+PROBE: NEGATIVE

## 2024-10-23 ENCOUNTER — TELEPHONE (OUTPATIENT)
Dept: OBGYN CLINIC | Age: 19
End: 2024-10-23

## 2024-10-23 RX ORDER — METRONIDAZOLE 500 MG/1
500 TABLET ORAL 2 TIMES DAILY
Qty: 14 TABLET | Refills: 0 | Status: SHIPPED | OUTPATIENT
Start: 2024-10-23 | End: 2024-10-30

## 2025-01-23 ENCOUNTER — NURSE ONLY (OUTPATIENT)
Dept: OBGYN CLINIC | Age: 20
End: 2025-01-23

## 2025-01-23 ENCOUNTER — TELEPHONE (OUTPATIENT)
Dept: OBGYN CLINIC | Age: 20
End: 2025-01-23

## 2025-01-23 DIAGNOSIS — N91.2 AMENORRHEA: ICD-10-CM

## 2025-01-23 DIAGNOSIS — Z72.51 UNPROTECTED SEXUAL INTERCOURSE: Primary | ICD-10-CM

## 2025-01-23 LAB
HCG, PREGNANCY, URINE, POC: NEGATIVE
VALID INTERNAL CONTROL, POC: NORMAL

## 2025-01-23 PROCEDURE — 81025 URINE PREGNANCY TEST: CPT | Performed by: OBSTETRICS & GYNECOLOGY

## 2025-01-23 NOTE — TELEPHONE ENCOUNTER
Pt LVM stating she needs to get back on depo injection- missed last window of admin.    Spoke with pt. Advised she will need to have 2 neg UPT, 2 weeks apart, and the depo can be administered after 2nd neg test (no unprotected intercourse in that 2 weeks). Pt verbalized understanding. Sched UPT appt.

## 2025-01-24 DIAGNOSIS — Z30.09 ENCOUNTER FOR COUNSELING REGARDING CONTRACEPTION: ICD-10-CM

## 2025-01-24 RX ORDER — MEDROXYPROGESTERONE ACETATE 150 MG/ML
150 INJECTION, SUSPENSION INTRAMUSCULAR
Qty: 1 ML | Refills: 0 | Status: SHIPPED | OUTPATIENT
Start: 2025-01-24

## 2025-02-03 ENCOUNTER — HOSPITAL ENCOUNTER (EMERGENCY)
Age: 20
Discharge: HOME | End: 2025-02-03
Attending: EMERGENCY MEDICINE

## 2025-02-03 VITALS
HEART RATE: 108 BPM | OXYGEN SATURATION: 96 % | RESPIRATION RATE: 18 BRPM | DIASTOLIC BLOOD PRESSURE: 77 MMHG | SYSTOLIC BLOOD PRESSURE: 114 MMHG | WEIGHT: 130 LBS | HEIGHT: 59 IN | TEMPERATURE: 98 F | BODY MASS INDEX: 26.21 KG/M2

## 2025-02-03 DIAGNOSIS — J30.81 ALLERGIC RHINITIS DUE TO ANIMAL HAIR AND DANDER: ICD-10-CM

## 2025-02-03 DIAGNOSIS — R00.0 SINUS TACHYCARDIA: Primary | ICD-10-CM

## 2025-02-03 LAB
ALBUMIN SERPL-MCNC: 4.3 G/DL (ref 3.5–5)
ALBUMIN/GLOB SERPL: 1.4 (ref 1–1.9)
ALP SERPL-CCNC: 68 U/L (ref 35–104)
ALT SERPL-CCNC: 76 U/L (ref 8–45)
ANION GAP SERPL CALC-SCNC: 13 MMOL/L (ref 7–16)
AST SERPL-CCNC: 54 U/L (ref 15–37)
BASOPHILS # BLD: 0.06 K/UL (ref 0–0.2)
BASOPHILS NFR BLD: 0.8 % (ref 0–2)
BILIRUB SERPL-MCNC: 0.3 MG/DL (ref 0–1.2)
BUN SERPL-MCNC: 9 MG/DL (ref 6–23)
CALCIUM SERPL-MCNC: 9.7 MG/DL (ref 8.8–10.2)
CHLORIDE SERPL-SCNC: 102 MMOL/L (ref 98–107)
CO2 SERPL-SCNC: 25 MMOL/L (ref 20–29)
CREAT SERPL-MCNC: 0.63 MG/DL (ref 0.6–1.1)
DIFFERENTIAL METHOD BLD: NORMAL
EKG ATRIAL RATE: 115 BPM
EKG DIAGNOSIS: NORMAL
EKG P AXIS: 76 DEGREES
EKG P-R INTERVAL: 126 MS
EKG Q-T INTERVAL: 322 MS
EKG QRS DURATION: 74 MS
EKG QTC CALCULATION (BAZETT): 445 MS
EKG R AXIS: 82 DEGREES
EKG T AXIS: 54 DEGREES
EKG VENTRICULAR RATE: 115 BPM
EOSINOPHIL # BLD: 0.15 K/UL (ref 0–0.8)
EOSINOPHIL NFR BLD: 1.9 % (ref 0.5–7.8)
ERYTHROCYTE [DISTWIDTH] IN BLOOD BY AUTOMATED COUNT: 12 % (ref 11.9–14.6)
GLOBULIN SER CALC-MCNC: 3.1 G/DL (ref 2.3–3.5)
GLUCOSE SERPL-MCNC: 95 MG/DL (ref 70–99)
HCT VFR BLD AUTO: 43.7 % (ref 35.8–46.3)
HGB BLD-MCNC: 14.6 G/DL (ref 11.7–15.4)
IMM GRANULOCYTES # BLD AUTO: 0.02 K/UL (ref 0–0.5)
IMM GRANULOCYTES NFR BLD AUTO: 0.3 % (ref 0–5)
LYMPHOCYTES # BLD: 3.12 K/UL (ref 0.5–4.6)
LYMPHOCYTES NFR BLD: 39.3 % (ref 13–44)
MCH RBC QN AUTO: 29.4 PG (ref 26.1–32.9)
MCHC RBC AUTO-ENTMCNC: 33.4 G/DL (ref 31.4–35)
MCV RBC AUTO: 88.1 FL (ref 82–102)
MONOCYTES # BLD: 0.62 K/UL (ref 0.1–1.3)
MONOCYTES NFR BLD: 7.8 % (ref 4–12)
NEUTS SEG # BLD: 3.97 K/UL (ref 1.7–8.2)
NEUTS SEG NFR BLD: 49.9 % (ref 43–78)
NRBC # BLD: 0 K/UL (ref 0–0.2)
PLATELET # BLD AUTO: 370 K/UL (ref 150–450)
PMV BLD AUTO: 9.4 FL (ref 9.4–12.3)
POTASSIUM SERPL-SCNC: 3.8 MMOL/L (ref 3.5–5.1)
PROT SERPL-MCNC: 7.4 G/DL (ref 6.3–8.2)
RBC # BLD AUTO: 4.96 M/UL (ref 4.05–5.2)
SODIUM SERPL-SCNC: 140 MMOL/L (ref 136–145)
TROPONIN T SERPL HS-MCNC: <6 NG/L (ref 0–14)
WBC # BLD AUTO: 7.9 K/UL (ref 4.3–11.1)

## 2025-02-03 PROCEDURE — 99284 EMERGENCY DEPT VISIT MOD MDM: CPT

## 2025-02-03 PROCEDURE — 93010 ELECTROCARDIOGRAM REPORT: CPT | Performed by: INTERNAL MEDICINE

## 2025-02-03 PROCEDURE — 85025 COMPLETE CBC W/AUTO DIFF WBC: CPT

## 2025-02-03 PROCEDURE — 84484 ASSAY OF TROPONIN QUANT: CPT

## 2025-02-03 PROCEDURE — 93005 ELECTROCARDIOGRAM TRACING: CPT | Performed by: EMERGENCY MEDICINE

## 2025-02-03 PROCEDURE — 80053 COMPREHEN METABOLIC PANEL: CPT

## 2025-02-03 RX ORDER — FEXOFENADINE HCL 180 MG/1
180 TABLET ORAL DAILY
Qty: 30 TABLET | Refills: 0 | Status: SHIPPED | OUTPATIENT
Start: 2025-02-03 | End: 2025-03-05

## 2025-02-03 RX ORDER — MONTELUKAST SODIUM 10 MG/1
10 TABLET ORAL NIGHTLY
Qty: 30 TABLET | Refills: 0 | Status: SHIPPED | OUTPATIENT
Start: 2025-02-03 | End: 2025-03-05

## 2025-02-03 ASSESSMENT — LIFESTYLE VARIABLES
HOW OFTEN DO YOU HAVE A DRINK CONTAINING ALCOHOL: NEVER
HOW MANY STANDARD DRINKS CONTAINING ALCOHOL DO YOU HAVE ON A TYPICAL DAY: PATIENT DOES NOT DRINK

## 2025-02-03 ASSESSMENT — PAIN - FUNCTIONAL ASSESSMENT: PAIN_FUNCTIONAL_ASSESSMENT: NONE - DENIES PAIN

## 2025-02-04 NOTE — ED PROVIDER NOTES
Emergency Department Provider Note       PCP: Not, On File (Inactive)   Age: 20 y.o.   Sex: female     DISPOSITION Decision To Discharge 02/03/2025 10:28:58 PM    ICD-10-CM    1. Sinus tachycardia  R00.0       2. Allergic rhinitis due to animal hair and dander  J30.81           Medical Decision Making     Patient's EKG shows sinus tachycardia but no arrhythmia.  Her labs are unremarkable other than some mildly elevated liver enzymes for which she can follow-up with her PCP.  Will refer her to an allergist so she can get shots to better tolerate her cats and will prescribe over-the-counter Allegra and a prescription allergy medicine as well.  Patient encouraged to take her atenolol upon return home.  There is no interaction with any of these allergy medicines.     1 acute illness with systemic symptoms.  Over the counter drug management performed.  Prescription drug management performed.  Shared medical decision making was utilized in creating the patients health plan today.  I independently ordered and reviewed each unique test.             ED provider's independent EKG interpretation EKG shows sinus tachycardia rate of 115, no acute ST-T changes, no STEMI, otherwise normal EKG no change from previous EKG            History     Patient presents concerned with elevated heart rate.  She has a history of palpitations and tachycardia for which she takes atenolol.  She has not taken her atenolol for a few days due to increased allergy symptoms.  She is allergic to Kassan had been away from her home for a bit and has moved back in with her cats.  She has taken Claritin and Benadryl without relief.  She took Sudafed and that is when her heart began to go faster.  As high as 130.  She denies chest pain or trouble breathing.  No cough or fever.  No diarrhea.  No dysuria urgency or frequency.  Patient denies body aches associated with a runny nose and congestion suggestive of viral illness.        Physical Exam     Vitals

## 2025-02-04 NOTE — DISCHARGE INSTRUCTIONS
Take medications as prescribed.  Allegra might be able to be found over-the-counter as well.  Take your atenolol upon return home.  Discontinue use of Sudafed given it can cause elevated heart rate and higher blood pressures.  Call the allergist provided for follow-up and recheck, allergy testing and possibly shots to help wife with your cats to be less symptomatic.

## 2025-02-04 NOTE — ED TRIAGE NOTES
Pt c/o palpitation, hx of tachycardia, pt states she hasn't been feeling well and took sudafed , also  hasn't taken Atenol last few days

## 2025-02-04 NOTE — ED NOTES
Patient mobility status  with no difficulty.     I have reviewed discharge instructions with the patient.  The patient verbalized understanding.    Patient left ED via Discharge Method: ambulatory to Home with  SELF .    Opportunity for questions and clarification provided.     Patient given 2 scripts.

## 2025-02-06 ENCOUNTER — CLINICAL DOCUMENTATION (OUTPATIENT)
Dept: OBGYN CLINIC | Age: 20
End: 2025-02-06

## 2025-02-06 NOTE — PROGRESS NOTES
Pt presents today for depo injection. Medication supplied by patient.       Depo-Provera 150 mg IM given by: Ashley Frausto CMA.  Dose - 150mg  Injection Site - right deltoid   - [facility]  Lot # - [id number]  Expiration - 4/1/2027  NDC - 31418-411-38    Pt tolerated well     Pt had a negative urine pregnancy test her today.

## 2025-05-04 DIAGNOSIS — Z30.09 ENCOUNTER FOR COUNSELING REGARDING CONTRACEPTION: ICD-10-CM

## 2025-05-05 DIAGNOSIS — Z30.09 ENCOUNTER FOR COUNSELING REGARDING CONTRACEPTION: ICD-10-CM

## 2025-05-05 RX ORDER — MEDROXYPROGESTERONE ACETATE 150 MG/ML
150 INJECTION, SUSPENSION INTRAMUSCULAR
Qty: 1 ML | Refills: 0 | OUTPATIENT
Start: 2025-05-05

## 2025-05-05 RX ORDER — MEDROXYPROGESTERONE ACETATE 150 MG/ML
150 INJECTION, SUSPENSION INTRAMUSCULAR
Qty: 1 ML | Refills: 1 | Status: SHIPPED | OUTPATIENT
Start: 2025-05-05

## 2025-05-06 ENCOUNTER — CLINICAL DOCUMENTATION (OUTPATIENT)
Dept: OBGYN CLINIC | Age: 20
End: 2025-05-06

## 2025-05-06 NOTE — PROGRESS NOTES
Pt presents today for depo injection. Medication supplied by patient.   Last Depo-Provera: 2/6/25.  Side Effects if any: None.  Serum HCG indicated? N/A.    Depo-Provera 150 mg IM given by: Ysabel Parkinson LPN.  Dose - 150mg/mL  Injection Site - Left Deltoid   - [employer]  Lot # - [id number]  Expiration - 04/01/2027  NDC - 66073-479-61    Pt tolerated well     Next appointment due between 7/22-8/5.

## 2025-07-29 ENCOUNTER — CLINICAL DOCUMENTATION (OUTPATIENT)
Dept: OBGYN CLINIC | Age: 20
End: 2025-07-29

## 2025-07-29 NOTE — PROGRESS NOTES
Pt presents today for depo injection. Medication supplied by patient.   Last Depo-Provera: 5/6/25.  Side Effects if any: None.  Serum HCG indicated? N/A.    Depo-Provera 150 mg IM given by: Ysabel Parkinson LPN.  Dose - 150 mg/mL  Injection Site - Right Deltoid   - [employer].  Lot # - [id number]  Expiration - 11/2027  NDC - 33458-245-58    Pt tolerated well     Next appointment due between 10/14-10/28/25.